# Patient Record
Sex: MALE | Race: OTHER
[De-identification: names, ages, dates, MRNs, and addresses within clinical notes are randomized per-mention and may not be internally consistent; named-entity substitution may affect disease eponyms.]

---

## 2019-08-14 ENCOUNTER — HOSPITAL ENCOUNTER (OUTPATIENT)
Dept: HOSPITAL 8 - RAD | Age: 59
Discharge: HOME | End: 2019-08-14
Attending: FAMILY MEDICINE
Payer: COMMERCIAL

## 2019-08-14 DIAGNOSIS — M79.89: ICD-10-CM

## 2019-08-14 DIAGNOSIS — N50.82: Primary | ICD-10-CM

## 2019-08-14 PROCEDURE — 76870 US EXAM SCROTUM: CPT

## 2024-03-19 ENCOUNTER — HOSPITAL ENCOUNTER (OUTPATIENT)
Facility: MEDICAL CENTER | Age: 64
End: 2024-03-19
Attending: NURSE PRACTITIONER
Payer: COMMERCIAL

## 2024-03-19 ENCOUNTER — NON-PROVIDER VISIT (OUTPATIENT)
Dept: OCCUPATIONAL MEDICINE | Facility: CLINIC | Age: 64
End: 2024-03-19

## 2024-03-19 ENCOUNTER — OFFICE VISIT (OUTPATIENT)
Dept: OCCUPATIONAL MEDICINE | Facility: CLINIC | Age: 64
End: 2024-03-19

## 2024-03-19 DIAGNOSIS — Z02.89 ENCOUNTER FOR OCCUPATIONAL HEALTH ASSESSMENT: ICD-10-CM

## 2024-03-19 DIAGNOSIS — Z02.1 PRE-EMPLOYMENT HEALTH SCREENING EXAMINATION: ICD-10-CM

## 2024-03-19 DIAGNOSIS — Z02.89 ENCOUNTER FOR OCCUPATIONAL HEALTH ASSESSMENT: Primary | ICD-10-CM

## 2024-03-19 LAB
AMP AMPHETAMINE: NORMAL
BAR BARBITURATES: NORMAL
BZO BENZODIAZEPINES: NORMAL
COC COCAINE: NORMAL
INT CON NEG: NORMAL
INT CON POS: NORMAL
MDMA ECSTASY: NORMAL
MET METHAMPHETAMINES: NORMAL
MTD METHADONE: NORMAL
OPI OPIATES: NORMAL
OXY OXYCODONE: NORMAL
PCP PHENCYCLIDINE: NORMAL
POC URINE DRUG SCREEN OCDRS: NORMAL
THC: NORMAL

## 2024-03-19 PROCEDURE — 8915 PR COMPREHENSIVE PHYSICAL: Performed by: NURSE PRACTITIONER

## 2024-03-19 PROCEDURE — 90715 TDAP VACCINE 7 YRS/> IM: CPT | Performed by: NURSE PRACTITIONER

## 2024-03-19 PROCEDURE — 90746 HEPB VACCINE 3 DOSE ADULT IM: CPT | Performed by: NURSE PRACTITIONER

## 2024-03-19 PROCEDURE — 80305 DRUG TEST PRSMV DIR OPT OBS: CPT | Performed by: NURSE PRACTITIONER

## 2024-03-19 PROCEDURE — 94375 RESPIRATORY FLOW VOLUME LOOP: CPT | Performed by: NURSE PRACTITIONER

## 2024-03-19 NOTE — PROGRESS NOTES
[place] Speaking patient, taken over by Luisa.    Pre Employment Drug Screen Completed  Mask Fit required along with [place].  Docs: Flu and COVID  Drawn Titers: MMR, VZV, and TB   Declined Hep B  Tdap admin

## 2024-03-20 LAB
GAMMA INTERFERON BACKGROUND BLD IA-ACNC: 0.05 IU/ML
M TB IFN-G BLD-IMP: NEGATIVE
M TB IFN-G CD4+ BCKGRND COR BLD-ACNC: 0.03 IU/ML
MITOGEN IGNF BCKGRD COR BLD-ACNC: >10 IU/ML
QFT TB2 - NIL TBQ2: 0.03 IU/ML

## 2024-03-22 LAB
MEV IGG SER-ACNC: >300 AU/ML
MUV IGG SER IA-ACNC: 242 AU/ML
RUBV AB SER QL: 331 IU/ML
VZV IGG SER IA-ACNC: >4000 IV

## 2024-04-19 ENCOUNTER — EH NON-PROVIDER (OUTPATIENT)
Dept: OCCUPATIONAL MEDICINE | Facility: CLINIC | Age: 64
End: 2024-04-19

## 2024-04-19 DIAGNOSIS — Z23 NEED FOR VACCINATION: ICD-10-CM

## 2024-04-19 PROCEDURE — 90746 HEPB VACCINE 3 DOSE ADULT IM: CPT | Performed by: NURSE PRACTITIONER

## 2024-09-24 ENCOUNTER — EH NON-PROVIDER (OUTPATIENT)
Dept: OCCUPATIONAL MEDICINE | Facility: CLINIC | Age: 64
End: 2024-09-24

## 2024-09-24 DIAGNOSIS — Z23 NEED FOR VACCINATION: Primary | ICD-10-CM

## 2024-09-24 PROCEDURE — 90656 IIV3 VACC NO PRSV 0.5 ML IM: CPT | Performed by: PREVENTIVE MEDICINE

## 2024-09-24 PROCEDURE — 90746 HEPB VACCINE 3 DOSE ADULT IM: CPT | Performed by: PREVENTIVE MEDICINE

## 2024-09-24 PROCEDURE — 90471 IMMUNIZATION ADMIN: CPT | Performed by: PREVENTIVE MEDICINE

## 2024-09-24 PROCEDURE — 90472 IMMUNIZATION ADMIN EACH ADD: CPT | Performed by: PREVENTIVE MEDICINE

## 2025-03-27 ENCOUNTER — HOSPITAL ENCOUNTER (OUTPATIENT)
Dept: LAB | Facility: MEDICAL CENTER | Age: 65
End: 2025-03-27
Attending: NURSE PRACTITIONER
Payer: MEDICAID

## 2025-03-27 ENCOUNTER — OFFICE VISIT (OUTPATIENT)
Dept: URGENT CARE | Facility: PHYSICIAN GROUP | Age: 65
End: 2025-03-27
Payer: MEDICAID

## 2025-03-27 VITALS
TEMPERATURE: 97 F | RESPIRATION RATE: 17 BRPM | HEART RATE: 95 BPM | BODY MASS INDEX: 30.05 KG/M2 | WEIGHT: 187 LBS | HEIGHT: 66 IN | DIASTOLIC BLOOD PRESSURE: 76 MMHG | SYSTOLIC BLOOD PRESSURE: 110 MMHG | OXYGEN SATURATION: 96 %

## 2025-03-27 DIAGNOSIS — R25.2 LEG CRAMPS: ICD-10-CM

## 2025-03-27 DIAGNOSIS — E11.69 TYPE 2 DIABETES MELLITUS WITH OTHER SPECIFIED COMPLICATION, WITHOUT LONG-TERM CURRENT USE OF INSULIN (HCC): ICD-10-CM

## 2025-03-27 DIAGNOSIS — R25.2 HAND CRAMPS: ICD-10-CM

## 2025-03-27 DIAGNOSIS — E11.65 HYPERGLYCEMIA DUE TO DIABETES MELLITUS (HCC): ICD-10-CM

## 2025-03-27 PROBLEM — E11.9 TYPE 2 DIABETES MELLITUS (HCC): Status: ACTIVE | Noted: 2023-09-20

## 2025-03-27 LAB
ALBUMIN SERPL BCP-MCNC: 4.7 G/DL (ref 3.2–4.9)
ALBUMIN/GLOB SERPL: 1.5 G/DL
ALP SERPL-CCNC: 114 U/L (ref 30–99)
ALT SERPL-CCNC: 36 U/L (ref 2–50)
ANION GAP SERPL CALC-SCNC: 15 MMOL/L (ref 7–16)
AST SERPL-CCNC: 25 U/L (ref 12–45)
BILIRUB SERPL-MCNC: 0.6 MG/DL (ref 0.1–1.5)
BUN SERPL-MCNC: 15 MG/DL (ref 8–22)
CALCIUM ALBUM COR SERPL-MCNC: 9.7 MG/DL (ref 8.5–10.5)
CALCIUM SERPL-MCNC: 10.3 MG/DL (ref 8.5–10.5)
CHLORIDE SERPL-SCNC: 97 MMOL/L (ref 96–112)
CO2 SERPL-SCNC: 22 MMOL/L (ref 20–33)
CREAT SERPL-MCNC: 0.9 MG/DL (ref 0.5–1.4)
EST. AVERAGE GLUCOSE BLD GHB EST-MCNC: 255 MG/DL
GFR SERPLBLD CREATININE-BSD FMLA CKD-EPI: 95 ML/MIN/1.73 M 2
GLOBULIN SER CALC-MCNC: 3.1 G/DL (ref 1.9–3.5)
GLUCOSE SERPL-MCNC: 177 MG/DL (ref 65–99)
HBA1C MFR BLD: 10.5 % (ref 4–5.6)
MAGNESIUM SERPL-MCNC: 2 MG/DL (ref 1.5–2.5)
POTASSIUM SERPL-SCNC: 4.2 MMOL/L (ref 3.6–5.5)
PROT SERPL-MCNC: 7.8 G/DL (ref 6–8.2)
SODIUM SERPL-SCNC: 134 MMOL/L (ref 135–145)

## 2025-03-27 PROCEDURE — 83036 HEMOGLOBIN GLYCOSYLATED A1C: CPT

## 2025-03-27 PROCEDURE — 83735 ASSAY OF MAGNESIUM: CPT

## 2025-03-27 PROCEDURE — 36415 COLL VENOUS BLD VENIPUNCTURE: CPT

## 2025-03-27 PROCEDURE — 80053 COMPREHEN METABOLIC PANEL: CPT

## 2025-03-27 RX ORDER — SULFAMETHOXAZOLE AND TRIMETHOPRIM 800; 160 MG/1; MG/1
TABLET ORAL
COMMUNITY
Start: 2025-03-24

## 2025-03-27 RX ORDER — TAMSULOSIN HYDROCHLORIDE 0.4 MG/1
CAPSULE ORAL
COMMUNITY

## 2025-03-27 RX ORDER — ASPIRIN 81 MG
TABLET,CHEWABLE ORAL
COMMUNITY

## 2025-03-27 RX ORDER — ATORVASTATIN CALCIUM 40 MG/1
40 TABLET, FILM COATED ORAL DAILY
COMMUNITY
Start: 2024-07-09 | End: 2025-07-09

## 2025-03-27 RX ORDER — GABAPENTIN 300 MG/1
CAPSULE ORAL
COMMUNITY

## 2025-03-27 RX ORDER — MECLIZINE HCL 12.5 MG 12.5 MG/1
12.5 TABLET ORAL
COMMUNITY

## 2025-03-27 RX ORDER — ERTUGLIFLOZIN 15 MG/1
TABLET, FILM COATED ORAL
COMMUNITY
Start: 2025-03-25

## 2025-03-27 RX ORDER — OXYBUTYNIN CHLORIDE 5 MG/1
TABLET ORAL
COMMUNITY
Start: 2025-01-14

## 2025-03-27 RX ORDER — LEVOTHYROXINE SODIUM 88 UG/1
TABLET ORAL
COMMUNITY

## 2025-03-27 RX ORDER — FINASTERIDE 5 MG/1
5 TABLET, FILM COATED ORAL DAILY
COMMUNITY

## 2025-03-27 RX ORDER — OMEPRAZOLE 40 MG/1
CAPSULE, DELAYED RELEASE ORAL
COMMUNITY

## 2025-03-27 ASSESSMENT — FIBROSIS 4 INDEX: FIB4 SCORE: 1.26

## 2025-03-27 NOTE — PROGRESS NOTES
Reji Cornelius is a 64 y.o. male who presents for Leg Problem (Cramping on legs and arms x 1.5 months )    Kiswahili Language Line  : # [phone] Gill     Accompanied by his wife today  PCP: @ [facility]    HPI  This is a new problem. Reji Cornelius is a 64 y.o. patient who presents to urgent care with c/o:  cramping in legs 1.5 months.  Cramping pain in legs at night.  Feeling normal during the day but feels a little weak in his legs.   Hard to sleep last night. He thought his bones were going to break when he was walking.  He gets a hard cramp in his calf that is very painful.   Stiffness in his fingers:  When he is trying to eat he feels like his fingers are stiff . Occurs all day long. He started to feel that yesterday in his fingers.   No recent illness.   Denies recent diarrhea, vomiting.   Diabetes, thyroid problem, hyperlipidemia, prostate problems, occ confusion - sometimes he doesn't remember where he needs to go when he his getting out of the elevator .     Takes all medications as prescribed.   Next appt with PCP was in 3 months. He just had appt on March 24th.  He says he did discuss his legs pains with his legs and was told that it was due to high blood sugar.  He did increase one of his medications.     ROS See HPI    Allergies:     No Known Allergies    PMSFS Hx:  No past medical history on file.  No past surgical history on file.  No family history on file.  Social History     Tobacco Use    Smoking status: Every Day     Types: Cigarettes    Smokeless tobacco: Never   Substance Use Topics    Alcohol use: Never         Problems:   There is no problem list on file for this patient.      Medications:   Current Outpatient Medications on File Prior to Visit   Medication Sig Dispense Refill    ASPIRIN LOW DOSE 81 MG Chew Tab chewable tablet CHEW 1 TABLET (81 MG TOTAL) DAILY      atorvastatin (LIPITOR) 40 MG Tab Take 40 mg by mouth every day.      oxybutynin (DITROPAN) 5 MG Tab TAKE 1  "TABLET BY MOUTH EVERY DAY AS NEEDED FOR 90 DAYS, FOR URINARY SYMPTOMS.      metformin (GLUCOPHAGE) 1000 MG tablet 60      STEGLATRO 15 MG Tab       omeprazole (PRILOSEC) 40 MG delayed-release capsule TAKE 1 CAPSULE (40 MG TOTAL) BY MOUTH DAILY FOR GASTROESOPHAGEAL REFLUX DISEASE      levothyroxine (SYNTHROID) 88 MCG Tab TAKE 1 TABLET (88 MCG TOTAL) BY MOUTH DAILY FOR THYROID      gabapentin (NEURONTIN) 300 MG Cap 30      tamsulosin (FLOMAX) 0.4 MG capsule 30      sulfamethoxazole-trimethoprim (BACTRIM DS) 800-160 MG tablet       meclizine (ANTIVERT) 12.5 MG Tab Take 12.5 mg by mouth.      finasteride (PROSCAR) 5 MG Tab Take 5 mg by mouth every day.       No current facility-administered medications on file prior to visit.        Objective:     /76   Pulse 95   Temp 36.1 °C (97 °F)   Resp 17   Ht 1.676 m (5' 6\")   Wt 84.8 kg (187 lb)   SpO2 96%   BMI 30.18 kg/m²     Physical Exam  Vitals and nursing note reviewed.   Constitutional:       Appearance: Normal appearance. He is well-developed and well-groomed.   Cardiovascular:      Rate and Rhythm: Normal rate and regular rhythm.      Pulses: Normal pulses.   Pulmonary:      Effort: Pulmonary effort is normal.   Musculoskeletal:      Right lower leg: No edema.      Left lower leg: No edema.      Comments: Strong equal handgrips.    Gait smooth and steady   Skin:     General: Skin is warm and dry.      Capillary Refill: Capillary refill takes less than 2 seconds.      Findings: No rash.   Neurological:      Mental Status: He is alert and oriented to person, place, and time.      Cranial Nerves: No cranial nerve deficit.      Coordination: Coordination normal.      Deep Tendon Reflexes: Reflexes are normal and symmetric.   Psychiatric:         Mood and Affect: Mood normal.         Speech: Speech normal.         Behavior: Behavior normal. Behavior is cooperative.         Thought Content: Thought content normal.     Status: Final result       Next appt: None  "   Test Result Released: No (inaccessible in MyChart)    0 Result Notes      Component  Ref Range & Units (hover) 10:03 AM   GFR (CKD-EPI) 95   Comment: Estimated Glomerular Filtration Rate is calculated using  race neutral CKD-EPI 2021 equation per NKF-ASN recommendations.   Resulting Keanu STINSON             Specimen Collected: 03/27/25 10:03 AM Last Resulted: 03/27/25  2:52 PM        Contains abnormal data HEMOGLOBIN A1C    1 HM Topic           Component  Ref Range & Units (hover) 10:03 AM  (3/27/25) 7 mo ago  (8/29/24) 10 mo ago  (5/21/24) 1 yr ago  (2/28/24) 1 yr ago  (12/1/23) 1 yr ago  (9/15/23)   Glycohemoglobin 10.5 High  8.1 High  R, CM 8.3 High  R, CM 6.7 R 7.4 High  R, CM 7.1 High    Comment: Increased risk for diabetes:  5.7 -6.4%  Diabetes:  >6.4%  Glycemic control for adults with diabetes:  <7.0%    The above interpretations are per ADA guidelines.  Diagnosis  of diabetes mellitus on the basis of elevated Hemoglobin A1c  should be confirmed by repeating the Hb A1c test.   Est Avg Glucose 255        Comment: The eAG calculation is based on the A1c-Derived Daily Glucose  (ADAG) study.  See the ADA's website for additional information.   Resulting Keanu STINSON [facility] [place] [facility] [place]  [facility] [id number] [facility]             Specimen Collected: 03/27/25 10:03 AM Last Resulted: 03/27/25  2:27 PM         View All Conversations on this Encounter     CM=Additional comments  R=Reference range differs from most recent result in table     Result Care Coordination      Patient Communication     Add Comments   Not seen Back to Top        Satisfied Health Maintenance Topics     Back to Top  A1c Screening (Every 6 Months)  Next due on 9/27/2025  Address Topic     Other Results from 3/27/2025      [facility]  Order: [id number]   Status: Final result                 Component  Ref Range & Units (hover) 10:03 AM   Magnesium 2.0   Resulting  Agency [facility]             Specimen Collected: 03/27/25 10:03 AM Last Resulted: 03/27/25  2:52 PM           View All Conversations on this Encounter                Component  Ref Range & Units (hover) 10:03 AM  (3/27/25) 9 mo ago  (6/25/24) 1 yr ago  (9/16/23) 1 yr ago  (9/15/23)   Sodium 134 Low  141 R 138 R 139 R   Potassium 4.2 4.0 R 3.9 R 3.5 R   Chloride 97 110 R 106 R 106 R   Co2 22 24.0 R 22.0 R 24.0 R   Anion Gap 15.0 7.0 R 10.0 R 9.0 R   Glucose 177 High       Bun 15 13.0 R 15.0 R 12.0 R   Creatinine 0.90 0.70 R 0.7 R 0.7 R   Calcium 10.3 9.2 R 9.7 R 9.9 R   Correct Calcium 9.7      AST(SGOT) 25  15 R 15 R   ALT(SGPT) 36  18 R 18 R   Alkaline Phosphatase 114 High   80 R 85 R   Total Bilirubin 0.6  0.6 R 0.5 R   Albumin 4.7  4.4 R 4.7 R   Total Protein 7.8  6.8 R 7.2 R   Globulin 3.1      A-G Ratio 1.5      Resulting Agency              Assessment /Associated Orders:      1. Leg cramps Acute Comp Metabolic Panel    [facility]    HEMOGLOBIN A1C    Nocturnal      2. Hand cramps  Comp Metabolic Panel    [facility]    HEMOGLOBIN A1C      3. Type 2 diabetes mellitus with other specified complication, without long-term current use of insulin (HCC)  HEMOGLOBIN A1C      4. Hyperglycemia due to diabetes mellitus (HCC)                Medical Decision Making:    Reji Cornelius is a very pleasant 64 y.o. male who is clinically stable at today's acute urgent care visit. Presents with acute problem/ concern today.    No acute distress is noted at the time of the visit.  VSS. Appropriate for outpatient care at this time.  Patient has variance and nocturnal cramps    Increase Gabapentin to 600 mg PO QHS prn leg pain. He has been taking 300 mg for a long time now without difficulty or side effects.   Referred to  primary care provider for monitoring and management. Educated in end organ effects of uncontrolled glucose including MI, CVA, Blindness, CRF and death. Educated in TLC's.  Advised close monitoring.  Please keep appt with  PCP     Stay well hydrated with low sugar / low carb drinks.   Labs : increase HgA1C to 10.5  otherwise essentially normal. Na slightly low - he could be drinking more water secondary to his elevated blood sugars.   OTC acetaminophen for breakthrough pain. Dosage and directions per . Do not exceed 3000 mg in 24 hours.   Massage therapy  Ice pack   Walking activities   Through shared decision making a discussion of the Dx and DDx, management options (risks,benefits, and alternatives to planned treatment), natural progression, supportive care and indications for immediate follow-up discussed. Expressed understanding and the treatment plan was agreed upon.    Questions were encouraged and answered     Follow Up:   Return to urgent care prn if new or worsening sx or if there is no improvement in condition prn.    Educated in Red flags and indications to immediately call 911 or present to the Emergency Department.       Time I spent evaluating Reji Cornelius in urgent care today was 32  minutes. This time includes preparing for visit, reviewing any pertinent notes or test results, exam, obtaining HPI, interpretation of lab tests,counseling/education, medication management ( RX and/ or OTC)  and documentation as indicated above.Time does not include separately billable procedures if noted .       Please note that this dictation was created using voice recognition software. I have worked with consultants from the vendor as well as technical experts from [facility] to optimize the interface. I have made every reasonable attempt to correct obvious errors, but I expect that there are errors of grammar and possibly content that I did not discover before finalizing the note.  This note was electronically signed by provider